# Patient Record
Sex: FEMALE | Race: WHITE | NOT HISPANIC OR LATINO | Employment: OTHER | URBAN - METROPOLITAN AREA
[De-identification: names, ages, dates, MRNs, and addresses within clinical notes are randomized per-mention and may not be internally consistent; named-entity substitution may affect disease eponyms.]

---

## 2020-10-26 ENCOUNTER — OFFICE VISIT (OUTPATIENT)
Dept: PHYSICAL MEDICINE AND REHAB | Facility: CLINIC | Age: 80
End: 2020-10-26
Payer: MEDICARE

## 2020-10-26 VITALS
DIASTOLIC BLOOD PRESSURE: 87 MMHG | HEART RATE: 84 BPM | OXYGEN SATURATION: 98 % | TEMPERATURE: 99 F | RESPIRATION RATE: 16 BRPM | SYSTOLIC BLOOD PRESSURE: 155 MMHG

## 2020-10-26 DIAGNOSIS — I48.91 ATRIAL FIBRILLATION, UNSPECIFIED TYPE: ICD-10-CM

## 2020-10-26 DIAGNOSIS — I63.9 CEREBROVASCULAR ACCIDENT (CVA), UNSPECIFIED MECHANISM: ICD-10-CM

## 2020-10-26 DIAGNOSIS — E78.5 HYPERLIPIDEMIA, UNSPECIFIED HYPERLIPIDEMIA TYPE: ICD-10-CM

## 2020-10-26 DIAGNOSIS — I69.320 APHASIA AS LATE EFFECT OF CEREBROVASCULAR ACCIDENT (CVA): ICD-10-CM

## 2020-10-26 DIAGNOSIS — Z79.4 TYPE 2 DIABETES MELLITUS WITH OTHER SPECIFIED COMPLICATION, WITH LONG-TERM CURRENT USE OF INSULIN: ICD-10-CM

## 2020-10-26 DIAGNOSIS — Z74.09 LIMITED MOBILITY: ICD-10-CM

## 2020-10-26 DIAGNOSIS — E11.69 TYPE 2 DIABETES MELLITUS WITH OTHER SPECIFIED COMPLICATION, WITH LONG-TERM CURRENT USE OF INSULIN: ICD-10-CM

## 2020-10-26 DIAGNOSIS — Z91.81 AT MODERATE RISK FOR FALL: ICD-10-CM

## 2020-10-26 DIAGNOSIS — R53.1 ASTHENIA: Primary | ICD-10-CM

## 2020-10-26 PROCEDURE — 99213 OFFICE O/P EST LOW 20 MIN: CPT | Mod: S$GLB,,, | Performed by: NURSE PRACTITIONER

## 2020-10-26 PROCEDURE — 99213 PR OFFICE/OUTPT VISIT, EST, LEVL III, 20-29 MIN: ICD-10-PCS | Mod: S$GLB,,, | Performed by: NURSE PRACTITIONER

## 2020-10-26 NOTE — PROGRESS NOTES
Subjective:       Patient ID: Lindsey Beasley is a 80 y.o. female.    Chief Complaint: Rehab f/u    HPI     78 y/o white female admit [facility] 9/21/2020 c/o nausea/ vomiting, weakness, slurred speech. CT brain no acute change; no MRI due to previous spinal stimulator implant. Neuro consult with Dr Rehman confirmed diagnosis of clinical stroke left hemisphere with aphasia/ right sided weakness.  Home with spouse prior to onset of present illness fairly active independent in a household environment.  She was able to participate with PT OT speech therapy interventions, struggled to regain prior level of mobility self-care and independent function.  Is high fall risk.  Also high risk for cardiovascular complications as she re-mobilizes.  Remains  high risk for extension of stroke or recurrent stroke during remobilization.  Required 3 weeks of continued intensive interdisciplinary inpatient rehabilitation with close medical monitoring during remobilization. She has since transitioned to assisted living facility. PT/OT continued through [facility]. Therapists working with her 2-3 days a week. She reports improvement in right upper and lower extremity weakness, speech, and strength/physial capacity. Currently moderate assist w/ ADLs. She has a alert device on person. No pain.      Review of Systems   Constitutional: Negative for activity change, appetite change, chills and fever.   HENT: Negative for congestion, hearing loss, nosebleeds, postnasal drip, sinus pressure, sore throat and trouble swallowing.    Respiratory: Negative for cough, chest tightness, shortness of breath and wheezing.    Cardiovascular: Negative for chest pain and palpitations.   Gastrointestinal: Negative for abdominal distention, abdominal pain, constipation, diarrhea, nausea and vomiting.   Genitourinary: Negative for difficulty urinating, flank pain, frequency, hematuria and urgency.   Musculoskeletal: Negative for arthralgias, back pain, joint swelling,  neck pain and neck stiffness.   Skin: Negative for color change and pallor.   Neurological: Negative for dizziness, seizures, syncope, weakness, light-headedness and headaches.   Hematological: Negative for adenopathy. Does not bruise/bleed easily.   Psychiatric/Behavioral: Negative for agitation, behavioral problems, confusion and sleep disturbance. The patient is not nervous/anxious.            Past Medical History:  No past medical history on file.   No past surgical history on file.   Review of patient's allergies indicates:  Not on File   No current outpatient medications on file.     No current facility-administered medications for this visit.      Social History     Socioeconomic History    Marital status:      Spouse name: Not on file    Number of children: Not on file    Years of education: Not on file    Highest education level: Not on file   Occupational History    Not on file   Social Needs    Financial resource strain: Not on file    Food insecurity     Worry: Not on file     Inability: Not on file    Transportation needs     Medical: Not on file     Non-medical: Not on file   Tobacco Use    Smoking status: Not on file   Substance and Sexual Activity    Alcohol use: Not on file    Drug use: Not on file    Sexual activity: Not on file   Lifestyle    Physical activity     Days per week: Not on file     Minutes per session: Not on file    Stress: Not on file   Relationships    Social connections     Talks on phone: Not on file     Gets together: Not on file     Attends Cheondo[employer] service: Not on file     Active member of club or organization: Not on file     Attends meetings of clubs or organizations: Not on file     Relationship status: Not on file   Other Topics Concern    Not on file   Social History Narrative    Not on file      No family history on file.     Objective:      Physical Exam  Constitutional:       Appearance: She is well-developed.   HENT:      Head: Normocephalic  and atraumatic.   Eyes:      General: No scleral icterus.     Conjunctiva/sclera: Conjunctivae normal.      Pupils: Pupils are equal, round, and reactive to light.   Neck:      Musculoskeletal: Normal range of motion and neck supple.      Thyroid: No thyroid mass.      Vascular: No carotid bruit.      Trachea: Trachea normal.   Cardiovascular:      Rate and Rhythm: Normal rate and regular rhythm.      Heart sounds: Normal heart sounds.   Pulmonary:      Effort: Pulmonary effort is normal.      Breath sounds: Normal breath sounds.   Abdominal:      General: Bowel sounds are normal.      Palpations: Abdomen is soft.   Musculoskeletal: Normal range of motion.   Skin:     General: Skin is warm and dry.   Neurological:      Mental Status: She is alert and oriented to person, place, and time.   Psychiatric:         Behavior: Behavior normal.         Judgment: Judgment normal.         Assessment:       1. Asthenia    2. Cerebrovascular accident (CVA), unspecified mechanism    3. 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 Atrial fibrillation, unspecified type    5. Type 2 diabetes mellitus with other specified complication, with long-term current use of insulin    6. Hyperlipidemia, unspecified hyperlipidemia type    7. Limited mobility    8. 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   PT/OT continued through [facility]. Therapists working with her 2-3 days a week. She reports improvement in right upper and lower  extremity weakness, speech, and strength/physial capacity. Currently moderate assist w/ ADLs. She has a alert device on person. No pain. Discussed keeping walkways and floors clear of debris, cords, furniture, and rugs that would pose a fall risk. Keep f/u appt w/ PCP.

## 2022-02-12 ENCOUNTER — OUTSIDE PLACE OF SERVICE (OUTPATIENT)
Dept: SURGERY | Facility: CLINIC | Age: 82
End: 2022-02-12
Payer: MEDICARE

## 2022-02-12 PROCEDURE — 99223 1ST HOSP IP/OBS HIGH 75: CPT | Mod: ,,, | Performed by: SURGERY

## 2022-02-12 PROCEDURE — 99223 PR INITIAL HOSPITAL CARE,LEVL III: ICD-10-PCS | Mod: ,,, | Performed by: SURGERY

## 2022-03-08 DIAGNOSIS — K56.609 SMALL BOWEL OBSTRUCTION: ICD-10-CM

## 2022-03-08 DIAGNOSIS — D64.9 ANEMIA: Primary | ICD-10-CM

## 2022-04-26 RX ORDER — LABETALOL 200 MG/1
200 TABLET, FILM COATED ORAL 2 TIMES DAILY
COMMUNITY
Start: 2022-01-18

## 2022-04-27 ENCOUNTER — OFFICE VISIT (OUTPATIENT)
Dept: GASTROENTEROLOGY | Facility: CLINIC | Age: 82
End: 2022-04-27
Payer: MEDICARE

## 2022-04-27 VITALS
HEART RATE: 78 BPM | HEIGHT: 60 IN | OXYGEN SATURATION: 97 % | DIASTOLIC BLOOD PRESSURE: 79 MMHG | SYSTOLIC BLOOD PRESSURE: 179 MMHG | WEIGHT: 138.69 LBS | BODY MASS INDEX: 27.23 KG/M2

## 2022-04-27 DIAGNOSIS — K56.609 SMALL BOWEL OBSTRUCTION: ICD-10-CM

## 2022-04-27 DIAGNOSIS — I10 HYPERTENSION, UNSPECIFIED TYPE: ICD-10-CM

## 2022-04-27 DIAGNOSIS — R11.2 NAUSEA AND VOMITING, INTRACTABILITY OF VOMITING NOT SPECIFIED, UNSPECIFIED VOMITING TYPE: ICD-10-CM

## 2022-04-27 DIAGNOSIS — K21.9 GASTROESOPHAGEAL REFLUX DISEASE, UNSPECIFIED WHETHER ESOPHAGITIS PRESENT: ICD-10-CM

## 2022-04-27 DIAGNOSIS — D50.9 IRON DEFICIENCY ANEMIA, UNSPECIFIED IRON DEFICIENCY ANEMIA TYPE: Primary | ICD-10-CM

## 2022-04-27 LAB
ABS NRBC COUNT: 0 X 10 3/UL (ref 0–0.01)
ABSOLUTE BASOPHIL: 0.03 X 10 3/UL (ref 0–0.22)
ABSOLUTE EOSINOPHIL: 0.11 X 10 3/UL (ref 0.04–0.54)
ABSOLUTE IMMATURE GRAN: 0.03 X 10 3/UL (ref 0–0.04)
ABSOLUTE LYMPHOCYTE: 0.92 X 10 3/UL (ref 0.86–4.75)
ABSOLUTE MONOCYTE: 0.98 X 10 3/UL (ref 0.22–1.08)
ALBUMIN SERPL-MCNC: 4 G/DL (ref 3.5–5.2)
ALBUMIN/GLOB SERPL ELPH: 1.6 {RATIO} (ref 1–2.7)
ALP ISOS SERPL LEV INH-CCNC: 122 U/L (ref 35–105)
ALT (SGPT): 9 U/L (ref 0–33)
ANION GAP SERPL CALC-SCNC: 13 MMOL/L (ref 8–17)
AST SERPL-CCNC: 8 U/L (ref 0–32)
BASOPHILS NFR BLD: 0.3 % (ref 0.2–1.2)
BILIRUBIN, TOTAL: 0.38 MG/DL (ref 0–1.2)
BUN/CREAT SERPL: 31.5 (ref 6–20)
CALCIUM SERPL-MCNC: 9.4 MG/DL (ref 8.6–10.2)
CARBON DIOXIDE, CO2: 26 MMOL/L (ref 22–29)
CHLORIDE: 106 MMOL/L (ref 98–107)
CREAT SERPL-MCNC: 0.73 MG/DL (ref 0.5–0.9)
EOSINOPHIL NFR BLD: 1.2 % (ref 0.7–7)
GFR ESTIMATION: 76.51
GLOBULIN: 2.5 G/DL (ref 1.5–4.5)
GLUCOSE: 184 MG/DL (ref 82–115)
HCT VFR BLD AUTO: 40.1 % (ref 37–47)
HGB BLD-MCNC: 12.1 G/DL (ref 12–16)
IMMATURE GRANULOCYTES: 0.3 % (ref 0–0.5)
LYMPHOCYTES NFR BLD: 9.8 % (ref 19.3–53.1)
MCH RBC QN AUTO: 26.9 PG (ref 27–32)
MCHC RBC AUTO-ENTMCNC: 30.2 G/DL (ref 32–36)
MCV RBC AUTO: 89.1 FL (ref 82–100)
MONOCYTES NFR BLD: 10.4 % (ref 4.7–12.5)
NEUTROPHILS # BLD AUTO: 7.32 X 10 3/UL (ref 2.15–7.56)
NEUTROPHILS NFR BLD: 78 % (ref 34–71.1)
NUCLEATED RED BLOOD CELLS: 0 /100 WBC (ref 0–0.2)
PLATELET # BLD AUTO: 272 X 10 3/UL (ref 135–400)
POTASSIUM: 4.1 MMOL/L (ref 3.5–5.1)
PROT SNV-MCNC: 6.5 G/DL (ref 6.4–8.3)
RBC # BLD AUTO: 4.5 X 10 6/UL (ref 4.2–5.4)
RDW-SD: 51.5 FL (ref 37–54)
SODIUM: 145 MMOL/L (ref 136–145)
UREA NITROGEN (BUN): 23 MG/DL (ref 8–23)
WBC # BLD: 9.39 X 10 3/UL (ref 4.3–10.8)

## 2022-04-27 PROCEDURE — 99213 OFFICE O/P EST LOW 20 MIN: CPT | Mod: S$GLB,,, | Performed by: INTERNAL MEDICINE

## 2022-04-27 PROCEDURE — 99213 PR OFFICE/OUTPT VISIT, EST, LEVL III, 20-29 MIN: ICD-10-PCS | Mod: S$GLB,,, | Performed by: INTERNAL MEDICINE

## 2022-04-27 RX ORDER — ATORVASTATIN CALCIUM 10 MG/1
10 TABLET, FILM COATED ORAL NIGHTLY
COMMUNITY
Start: 2022-02-10

## 2022-04-27 RX ORDER — INSULIN DEGLUDEC 100 U/ML
INJECTION, SOLUTION SUBCUTANEOUS
COMMUNITY
Start: 2022-02-21

## 2022-04-27 RX ORDER — DULAGLUTIDE 4.5 MG/.5ML
INJECTION, SOLUTION SUBCUTANEOUS
COMMUNITY
Start: 2022-04-01

## 2022-04-27 RX ORDER — PANTOPRAZOLE SODIUM 40 MG/1
40 TABLET, DELAYED RELEASE ORAL DAILY
COMMUNITY
Start: 2022-04-19

## 2022-04-27 RX ORDER — MYCOPHENOLATE MOFETIL 500 MG/1
1000 TABLET ORAL 2 TIMES DAILY
COMMUNITY
Start: 2022-04-19

## 2022-04-27 RX ORDER — GLIMEPIRIDE 2 MG/1
2 TABLET ORAL 2 TIMES DAILY WITH MEALS
COMMUNITY
Start: 2022-01-13

## 2022-04-27 RX ORDER — HYDRALAZINE HYDROCHLORIDE 50 MG/1
50 TABLET, FILM COATED ORAL 3 TIMES DAILY
COMMUNITY
Start: 2022-04-12

## 2022-04-27 RX ORDER — TACROLIMUS 1 MG/1
2 CAPSULE ORAL
COMMUNITY
Start: 2022-03-17

## 2022-04-27 RX ORDER — TOLTERODINE 4 MG/1
4 CAPSULE, EXTENDED RELEASE ORAL DAILY
COMMUNITY
Start: 2022-01-15

## 2022-04-27 RX ORDER — PREDNISONE 5 MG/1
5 TABLET ORAL DAILY
COMMUNITY
Start: 2022-04-11

## 2022-04-27 RX ORDER — APIXABAN 2.5 MG/1
2.5 TABLET, FILM COATED ORAL 2 TIMES DAILY
COMMUNITY
Start: 2022-03-28

## 2022-04-27 RX ORDER — AMLODIPINE BESYLATE 5 MG/1
5 TABLET ORAL NIGHTLY
COMMUNITY
Start: 2022-03-18

## 2022-04-27 NOTE — PROGRESS NOTES
Clinic Note    Reason for visit:  The primary encounter diagnosis was Iron deficiency anemia, unspecified iron deficiency anemia type. Diagnoses of Small bowel obstruction, Nausea and vomiting, intractability of vomiting not specified, unspecified vomiting type, Gastroesophageal reflux disease, unspecified whether esophagitis present, BMI 27.0-27.9,adult, and Hypertension, unspecified type were also pertinent to this visit.    PCP: Juan Suarez   [address] [facility] [id number]    HPI:  This is a 81 y.o. female with a h/o colonic AVMs-treated colon at [facility] 8/14/21, H/O candida of upper GI tract-s/p tx who is an established patient.  The patient was found to have a partial small bowel obstruction in hospital in 2/12/2022 but it resolved without surgery or other interventions. Patient denies any blood in stool or melena. Denies any abdominal pain currently. Patient takes panto 40 daily for reflux.       Review of Systems   Constitutional: Negative for chills, diaphoresis, fatigue, fever and unexpected weight change.   HENT: Negative for mouth sores, nosebleeds, postnasal drip, sore throat, trouble swallowing and voice change.    Eyes: Negative for pain, discharge and eye dryness.   Respiratory: Negative for apnea, cough, choking, chest tightness, shortness of breath and wheezing.    Cardiovascular: Negative for chest pain, palpitations, leg swelling and claudication.   Gastrointestinal: Negative for abdominal distention, abdominal pain, anal bleeding, blood in stool, change in bowel habit, constipation, diarrhea, nausea, rectal pain, vomiting, reflux, fecal incontinence and change in bowel habit.   Genitourinary: Positive for bladder incontinence. Negative for difficulty urinating, dysuria, flank pain, frequency and hematuria.   Musculoskeletal: Negative for arthralgias, back pain, joint swelling and joint deformity.   Integumentary:  Negative for color change, rash and wound.   Allergic/Immunologic:  Negative for environmental allergies and food allergies.   Neurological: Negative for seizures, facial asymmetry, speech difficulty, weakness, headaches and memory loss.   Hematological: Negative for adenopathy. Does not bruise/bleed easily.   Psychiatric/Behavioral: Negative for agitation, behavioral problems, confusion, hallucinations and sleep disturbance.      Past Medical History:   Diagnosis Date    Atrial fibrillation     Diabetes     Hypertension     Kidney transplanted     TIA (transient ischemic attack)      Past Surgical History:   Procedure Laterality Date    BACK SURGERY      COLECTOMY      COLONOSCOPY  08/2021    KIDNEY TRANSPLANT Right     NEPHRECTOMY Right     TONSILLECTOMY      UPPER GASTROINTESTINAL ENDOSCOPY  08/2021     Family History   Problem Relation Age of Onset    Colon cancer Neg Hx     Rectal cancer Neg Hx     Stomach cancer Neg Hx     Inflammatory bowel disease Neg Hx      Social History     Tobacco Use    Smoking status: Never Smoker    Smokeless tobacco: Never Used   Substance Use Topics    Alcohol use: Not Currently    Drug use: Never     Review of patient's allergies indicates:  No Known Allergies   Medication List with Changes/Refills   Current Medications    AMLODIPINE (NORVASC) 5 MG TABLET    Take 5 mg by mouth every evening.    ATORVASTATIN (LIPITOR) 10 MG TABLET    Take 10 mg by mouth nightly.    ELIQUIS 2.5 MG TAB    Take 2.5 mg by mouth 2 (two) times daily.    GLIMEPIRIDE (AMARYL) 2 MG TABLET    Take 2 mg by mouth 2 (two) times daily with meals.    HYDRALAZINE (APRESOLINE) 50 MG TABLET    Take 50 mg by mouth 3 (three) times daily.    LABETALOL (NORMODYNE) 200 MG TABLET    Take 200 mg by mouth 2 (two) times daily.    MYCOPHENOLATE (CELLCEPT) 500 MG TAB    Take 1,000 mg by mouth 2 (two) times daily.    PANTOPRAZOLE (PROTONIX) 40 MG TABLET    Take 40 mg by mouth once daily.    PREDNISONE (DELTASONE) 5 MG TABLET    Take 5 mg by mouth once daily.    TACROLIMUS  (PROGRAF) 1 MG CAP    Take 2 mg by mouth.    TOLTERODINE (DETROL LA) 4 MG 24 HR CAPSULE    Take 4 mg by mouth once daily.    TRESIBA FLEXTOUCH U-100 100 UNIT/ML (3 ML) INSULIN PEN    INJECT 12 UNITS SUBCUTANEOUS ONCE DAILY    TRULICITY 4.5 MG/0.5 ML PEN INJECTOR    INJECT THE CONTENTS OF 1 PEN UNDER THE SKIN ONCE A WEEK         Vital Signs:  BP (!) 179/79 (BP Location: Left arm, Patient Position: Sitting, BP Method: Medium (Automatic))   Pulse 78   Ht 5' (1.524 m)   Wt 62.9 kg (138 lb 11.2 oz)   SpO2 97%   BMI 27.09 kg/m²   Body mass index is 27.09 kg/m².      Physical Exam  Vitals reviewed.   Constitutional:       General: She is awake. She is not in acute distress.     Appearance: Normal appearance. She is well-developed. She is obese. She is not ill-appearing, toxic-appearing or diaphoretic.   HENT:      Head: Normocephalic and atraumatic.      Nose: Nose normal.      Mouth/Throat:      Mouth: Mucous membranes are moist.      Pharynx: Oropharynx is clear. No oropharyngeal exudate or posterior oropharyngeal erythema.   Eyes:      General: Lids are normal. Gaze aligned appropriately. No scleral icterus.        Right eye: No discharge.         Left eye: No discharge.      Extraocular Movements: Extraocular movements intact.      Conjunctiva/sclera: Conjunctivae normal.   Neck:      Trachea: Trachea normal.   Cardiovascular:      Rate and Rhythm: Normal rate and regular rhythm.      Pulses:           Radial pulses are 2+ on the right side and 2+ on the left side.   Pulmonary:      Effort: Pulmonary effort is normal. No respiratory distress.      Breath sounds: Normal breath sounds. No stridor. No wheezing or rhonchi.   Chest:      Chest wall: No tenderness.   Abdominal:      General: Bowel sounds are normal. There is no distension.      Palpations: Abdomen is soft. There is no fluid wave, hepatomegaly or mass.      Tenderness: There is no abdominal tenderness. There is no guarding or rebound.   Musculoskeletal:          General: No tenderness or deformity.      Cervical back: Full passive range of motion without pain and neck supple. No tenderness.      Right lower leg: No edema.      Left lower leg: No edema.   Lymphadenopathy:      Cervical: No cervical adenopathy.   Skin:     General: Skin is warm and dry.      Capillary Refill: Capillary refill takes less than 2 seconds.      Coloration: Skin is not cyanotic, jaundiced or pale.      Findings: No rash.   Neurological:      General: No focal deficit present.      Mental Status: She is alert and oriented to person, place, and time.      Cranial Nerves: No facial asymmetry.      Motor: No tremor.      Comments: Ambulates with walker   Psychiatric:         Attention and Perception: Attention normal.         Mood and Affect: Mood and affect normal.         Speech: Speech normal.         Behavior: Behavior normal. Behavior is cooperative.            All of the data above and below has been reviewed by myself and any further interpretations will be reflected in the assessment and plan.   The data includes review of external notes, and independent interpretation of lab results, procedures, x-rays, and imaging reports.      Assessment:  Iron deficiency anemia, unspecified iron deficiency anemia type  -     Ambulatory referral/consult to Gastroenterology  -     CBC Auto Differential; Future; Expected date: 04/27/2022  -     Comprehensive Metabolic Panel; Future; Expected date: 04/27/2022    Small bowel obstruction  -     Ambulatory referral/consult to Gastroenterology    Nausea and vomiting, intractability of vomiting not specified, unspecified vomiting type  -     CBC Auto Differential; Future; Expected date: 04/27/2022  -     Comprehensive Metabolic Panel; Future; Expected date: 04/27/2022    Gastroesophageal reflux disease, unspecified whether esophagitis present    BMI 27.0-27.9,adult    Hypertension, unspecified type         Recommendations:  Will get CBC and CMP. If  Creatinine is normal, will get CT Enterography to follow up on partial small bowel obstruction history.     Patient screened for and received weight management and nutritional counseling regarding BMI of less than 18 or greater than 25.  Patient counseled on hypertension including lifestyle modifications and medical management and advised to follow up with PCP for additional care.    Risks, benefits, and alternatives of medical management, any associated procedures, and/or treatment discussed with the patient. Patient given opportunity to ask questions and voices understanding. Patient has elected to proceed with the recommended care modalities as discussed.    Follow up in about 6 months (around 10/27/2022).    Order summary:  Orders Placed This Encounter   Procedures    CBC Auto Differential    Comprehensive Metabolic Panel          Fady Matthew MD    This document may have been created using a voice recognition transcribing system. Incorrect words or phrases may have been missed during proofreading. Please interpret accordingly or contact me for clarification.

## 2022-04-28 DIAGNOSIS — D50.9 IRON DEFICIENCY ANEMIA, UNSPECIFIED IRON DEFICIENCY ANEMIA TYPE: ICD-10-CM

## 2022-04-28 DIAGNOSIS — K56.609 SMALL BOWEL OBSTRUCTION: Primary | ICD-10-CM

## 2022-05-17 ENCOUNTER — TELEPHONE (OUTPATIENT)
Dept: GASTROENTEROLOGY | Facility: CLINIC | Age: 82
End: 2022-05-17
Payer: MEDICARE

## 2022-05-17 NOTE — TELEPHONE ENCOUNTER
----- Message from Jaida Huerta NP sent at 5/17/2022  4:27 PM CDT -----  Spoke to patient and gave results. Patient states urologist is Dr. Morris at [facility]. Elizabeth, please fax a copy of this report to urologist.

## 2023-02-09 ENCOUNTER — OFFICE VISIT (OUTPATIENT)
Dept: GASTROENTEROLOGY | Facility: CLINIC | Age: 83
End: 2023-02-09
Payer: MEDICARE

## 2023-02-09 VITALS
HEART RATE: 77 BPM | OXYGEN SATURATION: 98 % | DIASTOLIC BLOOD PRESSURE: 84 MMHG | HEIGHT: 60 IN | SYSTOLIC BLOOD PRESSURE: 138 MMHG | BODY MASS INDEX: 26.43 KG/M2 | WEIGHT: 134.63 LBS

## 2023-02-09 DIAGNOSIS — Z79.01 LONG TERM (CURRENT) USE OF ANTICOAGULANTS: ICD-10-CM

## 2023-02-09 DIAGNOSIS — K21.9 GASTROESOPHAGEAL REFLUX DISEASE, UNSPECIFIED WHETHER ESOPHAGITIS PRESENT: ICD-10-CM

## 2023-02-09 DIAGNOSIS — D50.9 IRON DEFICIENCY ANEMIA, UNSPECIFIED IRON DEFICIENCY ANEMIA TYPE: Primary | ICD-10-CM

## 2023-02-09 LAB
ABS NRBC COUNT: 0 X 10 3/UL (ref 0–0.01)
ABSOLUTE BASOPHIL: 0.03 X 10 3/UL (ref 0–0.22)
ABSOLUTE EOSINOPHIL: 0.06 X 10 3/UL (ref 0.04–0.54)
ABSOLUTE IMMATURE GRAN: 0.05 X 10 3/UL (ref 0–0.04)
ABSOLUTE LYMPHOCYTE: 0.87 X 10 3/UL (ref 0.86–4.75)
ABSOLUTE MONOCYTE: 0.71 X 10 3/UL (ref 0.22–1.08)
BASOPHILS NFR BLD: 0.3 % (ref 0.2–1.2)
EOSINOPHIL NFR BLD: 0.5 % (ref 0.7–7)
HCT VFR BLD AUTO: 40.1 % (ref 37–47)
HGB BLD-MCNC: 12.7 G/DL (ref 12–16)
IMMATURE GRANULOCYTES: 0.4 % (ref 0–0.5)
LYMPHOCYTES NFR BLD: 7.8 % (ref 19.3–53.1)
MCH RBC QN AUTO: 27.8 PG (ref 27–32)
MCHC RBC AUTO-ENTMCNC: 31.7 G/DL (ref 32–36)
MCV RBC AUTO: 87.7 FL (ref 82–100)
MONOCYTES NFR BLD: 6.4 % (ref 4.7–12.5)
NEUTROPHILS # BLD AUTO: 9.42 X 10 3/UL (ref 2.15–7.56)
NEUTROPHILS NFR BLD: 84.6 % (ref 34–71.1)
NUCLEATED RED BLOOD CELLS: 0 /100 WBC (ref 0–0.2)
PLATELET # BLD AUTO: 275 X 10 3/UL (ref 135–400)
RBC # BLD AUTO: 4.57 X 10 6/UL (ref 4.2–5.4)
RDW-SD: 51.3 FL (ref 37–54)
WBC # BLD: 11.14 X 10 3/UL (ref 4.3–10.8)

## 2023-02-09 PROCEDURE — 99214 OFFICE O/P EST MOD 30 MIN: CPT | Mod: S$GLB,,, | Performed by: INTERNAL MEDICINE

## 2023-02-09 PROCEDURE — 99214 PR OFFICE/OUTPT VISIT, EST, LEVL IV, 30-39 MIN: ICD-10-PCS | Mod: S$GLB,,, | Performed by: INTERNAL MEDICINE

## 2023-02-09 NOTE — PROGRESS NOTES
Call with results, Her blood hemoglobin is normal-good!-would recommend that her PCP check a CBC every 6 mo. Just to keep tabs on her blood coulds.

## 2023-02-09 NOTE — LETTER
February 9, 2023        Juan Suarez MD  [address] [facility] [facility] [address]  [place] [address]             [facility]  [address] [address]  Phone: [phone]  Fax: [phone]   Patient: Lindsey Beasley   MR Number: [id number]   YOB: 1940   Date of Visit: 2/9/2023       Dear Dr. Suarez:    Thank you for referring Lindsey Beasley to me for evaluation. Attached you will find relevant portions of my assessment and plan of care.    If you have questions, please do not hesitate to call me. I look forward to following Lindsey Beasley along with you.    Sincerely,      Fady Matthew MD            CC    No Recipients    Enclosure

## 2023-02-09 NOTE — PROGRESS NOTES
Clinic Note    Reason for visit:  The primary encounter diagnosis was Iron deficiency anemia, unspecified iron deficiency anemia type. Diagnoses of Gastroesophageal reflux disease, unspecified whether esophagitis present and Long term (current) use of anticoagulants were also pertinent to this visit.    PCP: Juan Suarez       HPI:  This is a 82 y.o. femalewith a h/o colonic AVMs-treated colon at [facility] 8/14/21, H/O candida of upper GI tract-s/p tx who is an established patient.  The patient was found to have a partial small bowel obstruction in hospital in 2/12/2022 but it resolved without surgery or other interventions.  She reports feeling well.  She denies melena or BRBPR.  She is not taking an iron pill currently.           Review of Systems   Constitutional:  Negative for chills, diaphoresis, fatigue, fever and unexpected weight change.   HENT:  Negative for mouth sores, nosebleeds, postnasal drip, sore throat, trouble swallowing and voice change.    Eyes:  Negative for pain, discharge and eye dryness.   Respiratory:  Negative for apnea, cough, choking, chest tightness, shortness of breath and wheezing.    Cardiovascular:  Negative for chest pain, palpitations, leg swelling and claudication.   Gastrointestinal:  Negative for abdominal distention, abdominal pain, anal bleeding, blood in stool, change in bowel habit, constipation, diarrhea, nausea, rectal pain, vomiting, reflux, fecal incontinence and change in bowel habit.   Genitourinary:  Positive for bladder incontinence. Negative for difficulty urinating, dysuria, flank pain, frequency and hematuria.   Musculoskeletal:  Negative for arthralgias, back pain, joint swelling and joint deformity.   Integumentary:  Negative for color change, rash and wound.   Allergic/Immunologic: Negative for environmental allergies and food allergies.   Neurological:  Negative for seizures, facial asymmetry, speech difficulty, weakness, headaches and memory loss.    Hematological:  Negative for adenopathy. Does not bruise/bleed easily.   Psychiatric/Behavioral:  Negative for agitation, behavioral problems, confusion, hallucinations and sleep disturbance.       Past Medical History:   Diagnosis Date    Atrial fibrillation     BMI 26.0-26.9,adult     Colon polyp     Diabetes     Hypertension     Kidney transplanted     TIA (transient ischemic attack)      Past Surgical History:   Procedure Laterality Date    BACK SURGERY      COLECTOMY      COLONOSCOPY  08/2021    KIDNEY TRANSPLANT Right     NEPHRECTOMY Right     TONSILLECTOMY      UPPER GASTROINTESTINAL ENDOSCOPY  08/2021     Family History   Problem Relation Age of Onset    Colon cancer Neg Hx     Rectal cancer Neg Hx     Stomach cancer Neg Hx     Inflammatory bowel disease Neg Hx     Liver cancer Neg Hx     Liver disease Neg Hx     Esophageal cancer Neg Hx     Throat cancer Neg Hx     Pancreatic cancer Neg Hx     Ulcerative colitis Neg Hx     Crohn's disease Neg Hx      Social History     Tobacco Use    Smoking status: Never    Smokeless tobacco: Never   Substance Use Topics    Alcohol use: Not Currently    Drug use: Never     Review of patient's allergies indicates:  No Known Allergies     Medication List with Changes/Refills   Current Medications    AMLODIPINE (NORVASC) 5 MG TABLET    Take 5 mg by mouth every evening.    ATORVASTATIN (LIPITOR) 10 MG TABLET    Take 10 mg by mouth nightly.    ELIQUIS 2.5 MG TAB    Take 2.5 mg by mouth 2 (two) times daily.    GLIMEPIRIDE (AMARYL) 2 MG TABLET    Take 2 mg by mouth 2 (two) times daily with meals.    HYDRALAZINE (APRESOLINE) 50 MG TABLET    Take 50 mg by mouth 3 (three) times daily.    LABETALOL (NORMODYNE) 200 MG TABLET    Take 200 mg by mouth 2 (two) times daily.    MYCOPHENOLATE (CELLCEPT) 500 MG TAB    Take 1,000 mg by mouth 2 (two) times daily.    PANTOPRAZOLE (PROTONIX) 40 MG TABLET    Take 40 mg by mouth once daily.    PREDNISONE (DELTASONE) 5 MG TABLET    Take 5 mg by  mouth once daily.    TACROLIMUS (PROGRAF) 1 MG CAP    Take 2 mg by mouth.    TOLTERODINE (DETROL LA) 4 MG 24 HR CAPSULE    Take 4 mg by mouth once daily.    TRESIBA FLEXTOUCH U-100 100 UNIT/ML (3 ML) INSULIN PEN    INJECT 12 UNITS SUBCUTANEOUS ONCE DAILY    TRULICITY 4.5 MG/0.5 ML PEN INJECTOR    INJECT THE CONTENTS OF 1 PEN UNDER THE SKIN ONCE A WEEK         Vital Signs:  /84   Pulse 77   Ht 5' (1.524 m)   Wt 61.1 kg (134 lb 9.6 oz)   SpO2 98%   BMI 26.29 kg/m²         Physical Exam  Vitals reviewed.   Constitutional:       General: She is awake. She is not in acute distress.     Appearance: Normal appearance. She is well-developed. She is not ill-appearing, toxic-appearing or diaphoretic.   HENT:      Head: Normocephalic and atraumatic.      Nose: Nose normal.      Mouth/Throat:      Mouth: Mucous membranes are moist.      Pharynx: Oropharynx is clear. No oropharyngeal exudate or posterior oropharyngeal erythema.   Eyes:      General: Lids are normal. Gaze aligned appropriately. No scleral icterus.        Right eye: No discharge.         Left eye: No discharge.      Extraocular Movements: Extraocular movements intact.      Conjunctiva/sclera: Conjunctivae normal.   Neck:      Trachea: Trachea normal.   Cardiovascular:      Rate and Rhythm: Normal rate and regular rhythm.      Pulses:           Radial pulses are 2+ on the right side and 2+ on the left side.   Pulmonary:      Effort: Pulmonary effort is normal. No respiratory distress.      Breath sounds: Normal breath sounds. No stridor. No wheezing or rhonchi.   Chest:      Chest wall: No tenderness.   Abdominal:      General: Bowel sounds are normal. There is no distension.      Palpations: Abdomen is soft. There is no fluid wave, hepatomegaly or mass.      Tenderness: There is no abdominal tenderness. There is no guarding or rebound.   Musculoskeletal:         General: No tenderness or deformity.      Cervical back: Full passive range of motion  without pain and neck supple. No tenderness.      Right lower leg: No edema.      Left lower leg: No edema.   Lymphadenopathy:      Cervical: No cervical adenopathy.   Skin:     General: Skin is warm and dry.      Capillary Refill: Capillary refill takes less than 2 seconds.      Coloration: Skin is not cyanotic, jaundiced or pale.      Findings: No rash.   Neurological:      General: No focal deficit present.      Mental Status: She is alert and oriented to person, place, and time.      Cranial Nerves: No facial asymmetry.      Motor: No tremor.   Psychiatric:         Attention and Perception: Attention normal.         Mood and Affect: Mood and affect normal.         Speech: Speech normal.         Behavior: Behavior normal. Behavior is cooperative.          All of the data above and below has been reviewed by myself and any further interpretations will be reflected in the assessment and plan.   The data includes review of external notes, and independent interpretation of lab results, procedures, x-rays, and imaging reports.      Assessment:  Iron deficiency anemia, unspecified iron deficiency anemia type    Gastroesophageal reflux disease, unspecified whether esophagitis present    Long term (current) use of anticoagulants         Recommendations:  CBC.    Risks, benefits, and alternatives of medical management, any associated procedures, and/or treatment discussed with the patient. Patient given opportunity to ask questions and voices understanding. Patient has elected to proceed with the recommended care modalities as discussed.    Follow up in about 6 months (around 8/9/2023).    Order summary:  No orders of the defined types were placed in this encounter.       Instructed patient to notify my office if they have not been contacted within two weeks after any procedures, submitting any samples (biopsies, blood, stool, urine, etc.) or after any imaging (X-ray, CT, MRI, etc.).     Fady Matthew MD    This  document may have been created using a voice recognition transcribing system. Incorrect words or phrases may have been missed during proofreading. Please interpret accordingly or contact me for clarification.

## 2023-02-09 NOTE — PATIENT INSTRUCTIONS
Please notify my office if you have not been contacted within two weeks after any procedures, submitting any samples (biopsies, blood, stool, urine, etc.) or after any imaging (X-ray, CT, MRI, etc.).     Blood work today-CBC.

## 2023-02-15 ENCOUNTER — TELEPHONE (OUTPATIENT)
Dept: GASTROENTEROLOGY | Facility: CLINIC | Age: 83
End: 2023-02-15
Payer: MEDICARE

## 2023-02-15 NOTE — TELEPHONE ENCOUNTER
----- Message from Fady Matthew MD sent at 2/9/2023  3:09 PM CST -----  Call with results, Her blood hemoglobin is normal-good!-would recommend that her PCP check a CBC every 6 mo. Just to keep tabs on her blood coulds.

## 2023-02-15 NOTE — TELEPHONE ENCOUNTER
Informed pt of results and recommendation for Dr. Suarez to follow up with CBC every 6 months, voiced understanding.

## 2023-08-10 ENCOUNTER — OFFICE VISIT (OUTPATIENT)
Dept: GASTROENTEROLOGY | Facility: CLINIC | Age: 83
End: 2023-08-10
Payer: MEDICARE

## 2023-08-10 VITALS
BODY MASS INDEX: 25.72 KG/M2 | DIASTOLIC BLOOD PRESSURE: 82 MMHG | HEIGHT: 60 IN | OXYGEN SATURATION: 98 % | SYSTOLIC BLOOD PRESSURE: 133 MMHG | HEART RATE: 76 BPM | WEIGHT: 131 LBS

## 2023-08-10 DIAGNOSIS — Z87.74 PERSONAL HISTORY OF ARTERIAL VENOUS MALFORMATION (AVM): Primary | ICD-10-CM

## 2023-08-10 PROCEDURE — 99214 PR OFFICE/OUTPT VISIT, EST, LEVL IV, 30-39 MIN: ICD-10-PCS | Mod: S$GLB,,, | Performed by: INTERNAL MEDICINE

## 2023-08-10 PROCEDURE — 99214 OFFICE O/P EST MOD 30 MIN: CPT | Mod: S$GLB,,, | Performed by: INTERNAL MEDICINE

## 2023-08-10 NOTE — PATIENT INSTRUCTIONS
Please notify my office if you have not been contacted within two weeks after any procedures, submitting any samples (biopsies, blood, stool, urine, etc.) or after any imaging (X-ray, CT, MRI, etc.).

## 2023-08-10 NOTE — PROGRESS NOTES
Clinic Note    Reason for visit:  The encounter diagnosis was Personal history of arterial venous malformation (AVM).    PCP: Juan Suarez       HPI:  This is a 82 y.o. female here for follow up. Pt.with hx of WILLIAM, Hx of AVM. She denies any melena, rectal bleeding, change in BH, abdominal pain, dysphagia. Recent blood work showed no anemia.    2/9/2023 H/H 12/40- NORMAL- recommended to follow up with PCP    h/o colonic AVMs-treated colon at [facility] 8/14/21, H/O candida of upper GI tract-s/p tx who is an established patient.    Review of Systems   Constitutional:  Negative for fatigue, fever and unexpected weight change.   HENT:  Positive for postnasal drip. Negative for mouth sores, sore throat and trouble swallowing.    Eyes:  Negative for pain, discharge and eye dryness.   Respiratory:  Negative for apnea, cough, choking, chest tightness, shortness of breath and wheezing.    Cardiovascular:  Negative for chest pain, palpitations and leg swelling.   Gastrointestinal:  Negative for abdominal distention, abdominal pain, anal bleeding, blood in stool, change in bowel habit, constipation, diarrhea, nausea, rectal pain, vomiting, reflux, fecal incontinence and change in bowel habit.   Genitourinary:  Positive for bladder incontinence. Negative for dysuria and hematuria.   Musculoskeletal:  Negative for arthralgias, back pain and joint swelling.   Integumentary:  Negative for color change and rash.   Allergic/Immunologic: Negative for environmental allergies and food allergies.   Neurological:  Negative for seizures and headaches.   Hematological:  Negative for adenopathy. Does not bruise/bleed easily.        Past Medical History:   Diagnosis Date    Atrial fibrillation     BMI 26.0-26.9,adult     Colon polyp     Diabetes     Hypertension     Kidney transplanted     TIA (transient ischemic attack)     Unspecified urinary incontinence      Past Surgical History:   Procedure Laterality Date    BACK SURGERY      COLECTOMY       COLONOSCOPY  08/2021    KIDNEY TRANSPLANT Right     NEPHRECTOMY Right     TONSILLECTOMY      UPPER GASTROINTESTINAL ENDOSCOPY  08/2021     Family History   Problem Relation Age of Onset    Colon cancer Neg Hx     Rectal cancer Neg Hx     Stomach cancer Neg Hx     Inflammatory bowel disease Neg Hx     Liver cancer Neg Hx     Liver disease Neg Hx     Esophageal cancer Neg Hx     Throat cancer Neg Hx     Pancreatic cancer Neg Hx     Ulcerative colitis Neg Hx     Crohn's disease Neg Hx      Social History     Tobacco Use    Smoking status: Never    Smokeless tobacco: Never   Substance Use Topics    Alcohol use: Not Currently    Drug use: Never     Review of patient's allergies indicates:  No Known Allergies     Medication List with Changes/Refills   Current Medications    AMLODIPINE (NORVASC) 5 MG TABLET    Take 5 mg by mouth every evening.    ATORVASTATIN (LIPITOR) 10 MG TABLET    Take 10 mg by mouth nightly.    ELIQUIS 2.5 MG TAB    Take 2.5 mg by mouth 2 (two) times daily.    GLIMEPIRIDE (AMARYL) 2 MG TABLET    Take 2 mg by mouth 2 (two) times daily with meals.    HYDRALAZINE (APRESOLINE) 50 MG TABLET    Take 50 mg by mouth 3 (three) times daily.    LABETALOL (NORMODYNE) 200 MG TABLET    Take 200 mg by mouth 2 (two) times daily.    MYCOPHENOLATE (CELLCEPT) 500 MG TAB    Take 1,000 mg by mouth 2 (two) times daily.    PANTOPRAZOLE (PROTONIX) 40 MG TABLET    Take 40 mg by mouth once daily.    PREDNISONE (DELTASONE) 5 MG TABLET    Take 5 mg by mouth once daily.    TACROLIMUS (PROGRAF) 1 MG CAP    Take 2 mg by mouth.    TOLTERODINE (DETROL LA) 4 MG 24 HR CAPSULE    Take 4 mg by mouth once daily.    TRESIBA FLEXTOUCH U-100 100 UNIT/ML (3 ML) INSULIN PEN    INJECT 12 UNITS SUBCUTANEOUS ONCE DAILY    TRULICITY 4.5 MG/0.5 ML PEN INJECTOR    INJECT THE CONTENTS OF 1 PEN UNDER THE SKIN ONCE A WEEK         Vital Signs:  /82 (BP Location: Left arm, Patient Position: Sitting, BP Method: Medium (Automatic))   Pulse 76    Ht 5' (1.524 m)   Wt 59.4 kg (131 lb)   SpO2 98%   BMI 25.58 kg/m²         Physical Exam  Vitals reviewed.   Constitutional:       General: She is awake. She is not in acute distress.     Appearance: Normal appearance. She is well-developed. She is not ill-appearing, toxic-appearing or diaphoretic.      Comments: With walker   HENT:      Head: Normocephalic and atraumatic.      Nose: Nose normal.      Mouth/Throat:      Mouth: Mucous membranes are moist.      Pharynx: Oropharynx is clear. No oropharyngeal exudate or posterior oropharyngeal erythema.   Eyes:      General: Lids are normal. Gaze aligned appropriately. No scleral icterus.        Right eye: No discharge.         Left eye: No discharge.      Conjunctiva/sclera: Conjunctivae normal.   Neck:      Trachea: Trachea normal.   Cardiovascular:      Rate and Rhythm: Normal rate and regular rhythm.      Pulses:           Radial pulses are 2+ on the right side and 2+ on the left side.   Pulmonary:      Effort: Pulmonary effort is normal. No respiratory distress.      Breath sounds: No stridor. No wheezing.   Chest:      Chest wall: No tenderness.   Abdominal:      General: Bowel sounds are normal. There is no distension.      Palpations: Abdomen is soft. There is no fluid wave, hepatomegaly or mass.      Tenderness: There is no abdominal tenderness. There is no guarding or rebound.   Musculoskeletal:         General: No tenderness or deformity.      Cervical back: Full passive range of motion without pain and neck supple. No tenderness.      Right lower leg: No edema.      Left lower leg: No edema.   Lymphadenopathy:      Cervical: No cervical adenopathy.   Skin:     General: Skin is warm and dry.      Capillary Refill: Capillary refill takes less than 2 seconds.      Coloration: Skin is not cyanotic, jaundiced or pale.   Neurological:      General: No focal deficit present.      Mental Status: She is alert and oriented to person, place, and time.      Motor: No  tremor.   Psychiatric:         Attention and Perception: Attention normal.         Mood and Affect: Mood and affect normal.         Speech: Speech normal.         Behavior: Behavior normal. Behavior is cooperative.            All of the data above and below has been reviewed by myself and any further interpretations will be reflected in the assessment and plan.   The data includes review of external notes, and independent interpretation of lab results, procedures, x-rays, and imaging reports.      Assessment:  Personal history of arterial venous malformation (AVM)  Comments:  COLON in 2021         Recommendations:    Continue to follow up with PCP.  Notify GI if having any blood in stool       Risks, benefits, and alternatives of medical management, any associated procedures, and/or treatment discussed with the patient. Patient given opportunity to ask questions and voices understanding. Patient has elected to proceed with the recommended care modalities as discussed.    Instructed patient to notify my office if they have not been contacted within two weeks after any procedures, submitting any samples (biopsies, blood, stool, urine, etc.) or after any imaging (X-ray, CT, MRI, etc.).     Follow up if symptoms worsen or fail to improve.    Order summary:  No orders of the defined types were placed in this encounter.         This document may have been created using a voice recognition transcribing system. Incorrect words or phrases may have been missed during proofreading. Please interpret accordingly or contact me for clarification.     Fady Matthew MD

## 2024-05-23 ENCOUNTER — OUTSIDE PLACE OF SERVICE (OUTPATIENT)
Dept: PULMONOLOGY | Facility: CLINIC | Age: 84
End: 2024-05-23
Payer: MEDICARE

## 2024-05-23 ENCOUNTER — OUTSIDE PLACE OF SERVICE (OUTPATIENT)
Dept: INTERVENTIONAL RADIOLOGY/VASCULAR | Facility: CLINIC | Age: 84
End: 2024-05-23
Payer: MEDICARE

## 2024-05-23 PROCEDURE — 99291 CRITICAL CARE FIRST HOUR: CPT | Mod: ,,, | Performed by: INTERNAL MEDICINE

## 2024-05-24 ENCOUNTER — OUTSIDE PLACE OF SERVICE (OUTPATIENT)
Dept: PULMONOLOGY | Facility: CLINIC | Age: 84
End: 2024-05-24
Payer: MEDICARE

## 2024-05-24 PROCEDURE — 99291 CRITICAL CARE FIRST HOUR: CPT | Mod: ,,, | Performed by: INTERNAL MEDICINE
